# Patient Record
Sex: FEMALE | URBAN - METROPOLITAN AREA
[De-identification: names, ages, dates, MRNs, and addresses within clinical notes are randomized per-mention and may not be internally consistent; named-entity substitution may affect disease eponyms.]

---

## 2020-12-03 ENCOUNTER — OFFICE VISIT (OUTPATIENT)
Dept: URBAN - METROPOLITAN AREA CLINIC 31 | Facility: CLINIC | Age: 70
End: 2020-12-03
Payer: MEDICARE

## 2020-12-03 PROCEDURE — 92134 CPTRZ OPH DX IMG PST SGM RTA: CPT | Performed by: OPHTHALMOLOGY

## 2020-12-03 PROCEDURE — 92014 COMPRE OPH EXAM EST PT 1/>: CPT | Performed by: OPHTHALMOLOGY

## 2020-12-03 ASSESSMENT — INTRAOCULAR PRESSURE
OS: 10
OD: 12

## 2020-12-03 NOTE — IMPRESSION/PLAN
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 
s/p Avastin last 09/10/20
s/p Ozurdex last 06/06/2019(SI) Plan: Vision is worse OD today and stable OS. Exam and OCT confirm improving DME s/p avastin OU. Recommend bilateral avastin injections today to prevent recurrence as there is significant non-perfusion in the periphery. The diagnosis, natural history, and prognosis of PDR with DME, as well as the R/B/A of PRP, anti-VEGF, vitrectomy, and observation were discussed. BS/BP/lipid control discussed. Avastin OU performed today without complication RTC 10- 12 weeks OCT OU reeval Avastin vs Ozurdex

## 2020-12-03 NOTE — IMPRESSION/PLAN
Impression: Glaucoma: H40.9. OU. Plan: Glaucoma suspect based on ON appearance. IOP is slightly elevated and ON appearance is suspicious. Continue with timolol, BID in both eyes. Will follow closely.

## 2020-12-03 NOTE — IMPRESSION/PLAN
Impression: Vitreous hemorrhage, bilateral: H43.13 OU. Plan: Vision notes floaters OS. Exam confirms hemorrhage in the left eye>OD, secondary to PDR. Discussed R/B/A's of observation vs. PPV vs. Anti-VEGF injection. Recommend antiVEGF OU. Patient to sleep with elevation. May consider PPV if does not resolve on its own.

## 2021-03-04 ENCOUNTER — OFFICE VISIT (OUTPATIENT)
Dept: URBAN - METROPOLITAN AREA CLINIC 31 | Facility: CLINIC | Age: 71
End: 2021-03-04
Payer: MEDICARE

## 2021-03-04 PROCEDURE — 92012 INTRM OPH EXAM EST PATIENT: CPT | Performed by: OPHTHALMOLOGY

## 2021-03-04 PROCEDURE — 92134 CPTRZ OPH DX IMG PST SGM RTA: CPT | Performed by: OPHTHALMOLOGY

## 2021-03-04 ASSESSMENT — INTRAOCULAR PRESSURE
OS: 16
OD: 18

## 2021-03-04 NOTE — IMPRESSION/PLAN
Impression: Vitreous hemorrhage, bilateral: H43.13 OU. Plan: Vision notes floaters OS. Exam confirms hemorrhage is improving in the left eye>OD, secondary to PDR. Discussed R/B/A's of observation vs. PPV vs. Anti-VEGF injection. Recommend antiVEGF OU. Patient to sleep with elevation. May consider PPV if does not resolve on its own.

## 2021-03-04 NOTE — IMPRESSION/PLAN
Impression: Type 2 diab with prolif diab rtnop with macular edema, bi: [id number]. OU. 
s/p Avastin last 12/03/20
s/p Ozurdex last 06/06/2019(SI) Plan: Vision is stable today OU. Exam and OCT confirm improving DME s/p avastin OU. Recommend bilateral avastin injections today to prevent recurrence as there is significant non-perfusion in the periphery. The diagnosis, natural history, and prognosis of PDR with DME, as well as the R/B/A of PRP, anti-VEGF, vitrectomy, and observation were discussed. BS/BP/lipid control discussed. Avastin OU performed today without complication RTC 10- 12 weeks OCT OU reeval Avastin vs Ozurdex

## 2021-06-03 ENCOUNTER — OFFICE VISIT (OUTPATIENT)
Dept: URBAN - METROPOLITAN AREA CLINIC 31 | Facility: CLINIC | Age: 71
End: 2021-06-03
Payer: MEDICARE

## 2021-06-03 DIAGNOSIS — E11.3513 TYPE 2 DIAB WITH PROLIF DIAB RTNOP WITH MACULAR EDEMA, BI: Primary | ICD-10-CM

## 2021-06-03 DIAGNOSIS — Z96.1 PRESENCE OF INTRAOCULAR LENS: ICD-10-CM

## 2021-06-03 DIAGNOSIS — H04.123 DRY EYE SYNDROME OF BILATERAL LACRIMAL GLANDS: ICD-10-CM

## 2021-06-03 PROCEDURE — 92012 INTRM OPH EXAM EST PATIENT: CPT | Performed by: OPHTHALMOLOGY

## 2021-06-03 PROCEDURE — 92134 CPTRZ OPH DX IMG PST SGM RTA: CPT | Performed by: OPHTHALMOLOGY

## 2021-06-03 ASSESSMENT — INTRAOCULAR PRESSURE
OD: 18
OS: 18

## 2021-06-03 NOTE — IMPRESSION/PLAN
Impression: Type 2 diab with prolif diab rtnop with macular edema, bi: [id number]. OU. 
s/p Avastin last 03/04/2021
s/p Ozurdex last 06/06/2019-SI Plan: Vision is stable today OU. Exam and OCT confirm improving DME s/p avastin OU. Recommend bilateral avastin injections today to prevent recurrence as there is significant non-perfusion in the periphery. The diagnosis, natural history, and prognosis of PDR with DME, as well as the R/B/A of PRP, anti-VEGF, vitrectomy, and observation were discussed. BS/BP/lipid control discussed. Avastin OU performed today without complication RTC 12 weeks OCT OU reeval Avastin

## 2021-09-09 ENCOUNTER — OFFICE VISIT (OUTPATIENT)
Dept: URBAN - METROPOLITAN AREA CLINIC 31 | Facility: CLINIC | Age: 71
End: 2021-09-09
Payer: MEDICARE

## 2021-09-09 DIAGNOSIS — H43.13 VITREOUS HEMORRHAGE, BILATERAL: ICD-10-CM

## 2021-09-09 PROCEDURE — 92014 COMPRE OPH EXAM EST PT 1/>: CPT | Performed by: OPHTHALMOLOGY

## 2021-09-09 ASSESSMENT — INTRAOCULAR PRESSURE
OS: 16
OD: 15

## 2021-09-09 NOTE — IMPRESSION/PLAN
Impression: Glaucoma: H40.9. OU. Plan: Glaucoma suspect based on ON appearance. IOP is improved and ON appearance is suspicious. Continue with timolol, BID in both eyes. Will follow closely.

## 2021-09-09 NOTE — IMPRESSION/PLAN
Impression: Type 2 diab with prolif diab rtnop with macular edema, bi: [id number]. OU. 
s/p Avastin last 06/30/2021
s/p Ozurdex last 06/06/2019-SI Plan: Vision is stable today OU. Exam and OCT confirm improving DME s/p avastin OU. Recommend bilateral avastin injections today to prevent recurrence as there is significant non-perfusion in the periphery. The diagnosis, natural history, and prognosis of PDR with DME, as well as the R/B/A of PRP, anti-VEGF, vitrectomy, and observation were discussed. BS/BP/lipid control discussed. Avastin OU performed today without complication RTC 12 weeks OCT OU reeval Avastin

## 2021-12-02 ENCOUNTER — OFFICE VISIT (OUTPATIENT)
Dept: URBAN - METROPOLITAN AREA CLINIC 31 | Facility: CLINIC | Age: 71
End: 2021-12-02
Payer: MEDICARE

## 2021-12-02 DIAGNOSIS — H40.9 GLAUCOMA: ICD-10-CM

## 2021-12-02 PROCEDURE — 92134 CPTRZ OPH DX IMG PST SGM RTA: CPT | Performed by: OPHTHALMOLOGY

## 2021-12-02 PROCEDURE — 92014 COMPRE OPH EXAM EST PT 1/>: CPT | Performed by: OPHTHALMOLOGY

## 2021-12-02 ASSESSMENT — INTRAOCULAR PRESSURE
OS: 17
OD: 19

## 2021-12-02 NOTE — IMPRESSION/PLAN
Impression: Type 2 diab with prolif diab rtnop with macular edema, bi: [id number]. OU. 
s/p Avastin last 09/09/2021
s/p Ozurdex last 06/06/2019-SI Plan: Vision is stable today OU. Exam and OCT confirm improving DME s/p avastin OU. Recommend bilateral avastin injections today to prevent recurrence as there is significant non-perfusion in the periphery. The diagnosis, natural history, and prognosis of PDR with DME, as well as the R/B/A of PRP, anti-VEGF, vitrectomy, and observation were discussed. BS/BP/lipid control discussed. Avastin OU performed today without complication RTC 12 weeks OCT OU reeval Avastin

## 2022-02-24 ENCOUNTER — OFFICE VISIT (OUTPATIENT)
Dept: URBAN - METROPOLITAN AREA CLINIC 31 | Facility: CLINIC | Age: 72
End: 2022-02-24
Payer: MEDICARE

## 2022-02-24 PROCEDURE — 99214 OFFICE O/P EST MOD 30 MIN: CPT | Performed by: OPHTHALMOLOGY

## 2022-02-24 PROCEDURE — 92134 CPTRZ OPH DX IMG PST SGM RTA: CPT | Performed by: OPHTHALMOLOGY

## 2022-02-24 ASSESSMENT — INTRAOCULAR PRESSURE
OS: 16
OD: 14

## 2022-02-24 NOTE — IMPRESSION/PLAN
Impression: Type 2 diab with prolif diab rtnop with macular edema, bi: [id number]. OU. 
s/p Avastin last 12/02/2021
s/p Ozurdex last 06/06/2019-SI Plan: Vision is stable today OU. Exam and OCT confirm improving DME s/p avastin OU. Recommend bilateral avastin injections today to prevent recurrence as there is significant non-perfusion in the periphery. The diagnosis, natural history, and prognosis of PDR with DME, as well as the R/B/A of PRP, anti-VEGF, vitrectomy, and observation were discussed. BS/BP/lipid control discussed. Avastin OU performed today without complication RTC 12 weeks OCT OU reeval Avastin

## 2022-05-26 ENCOUNTER — OFFICE VISIT (OUTPATIENT)
Dept: URBAN - METROPOLITAN AREA CLINIC 31 | Facility: CLINIC | Age: 72
End: 2022-05-26
Payer: MEDICARE

## 2022-05-26 DIAGNOSIS — H43.13 VITREOUS HEMORRHAGE, BILATERAL: ICD-10-CM

## 2022-05-26 DIAGNOSIS — Z96.1 PRESENCE OF INTRAOCULAR LENS: ICD-10-CM

## 2022-05-26 DIAGNOSIS — E11.3513 TYPE 2 DIAB WITH PROLIF DIAB RTNOP WITH MACULAR EDEMA, BI: Primary | ICD-10-CM

## 2022-05-26 DIAGNOSIS — H40.9 GLAUCOMA: ICD-10-CM

## 2022-05-26 DIAGNOSIS — H04.123 DRY EYE SYNDROME OF BILATERAL LACRIMAL GLANDS: ICD-10-CM

## 2022-05-26 PROCEDURE — 99214 OFFICE O/P EST MOD 30 MIN: CPT | Performed by: OPHTHALMOLOGY

## 2022-05-26 PROCEDURE — 92134 CPTRZ OPH DX IMG PST SGM RTA: CPT | Performed by: OPHTHALMOLOGY

## 2022-05-26 ASSESSMENT — INTRAOCULAR PRESSURE
OS: 9
OD: 10

## 2022-08-11 ENCOUNTER — OFFICE VISIT (OUTPATIENT)
Dept: URBAN - METROPOLITAN AREA CLINIC 31 | Facility: CLINIC | Age: 72
End: 2022-08-11
Payer: MEDICARE

## 2022-08-11 DIAGNOSIS — E11.3513 TYPE 2 DIAB WITH PROLIF DIAB RTNOP WITH MACULAR EDEMA, BI: Primary | ICD-10-CM

## 2022-08-11 DIAGNOSIS — H40.9 GLAUCOMA: ICD-10-CM

## 2022-08-11 DIAGNOSIS — S05.12XA CONTUSION OF EYEBALL AND ORBITAL TISSUES, LEFT EYE, INIT: ICD-10-CM

## 2022-08-11 DIAGNOSIS — Z96.1 PRESENCE OF INTRAOCULAR LENS: ICD-10-CM

## 2022-08-11 DIAGNOSIS — H04.123 DRY EYE SYNDROME OF BILATERAL LACRIMAL GLANDS: ICD-10-CM

## 2022-08-11 DIAGNOSIS — H43.13 VITREOUS HEMORRHAGE, BILATERAL: ICD-10-CM

## 2022-08-11 PROCEDURE — 99214 OFFICE O/P EST MOD 30 MIN: CPT | Performed by: OPHTHALMOLOGY

## 2022-08-11 PROCEDURE — 92134 CPTRZ OPH DX IMG PST SGM RTA: CPT | Performed by: OPHTHALMOLOGY

## 2022-08-11 RX ORDER — METOPROLOL SUCCINATE 50 MG/1
50 MG TABLET, EXTENDED RELEASE ORAL
Qty: 0 | Refills: 0 | Status: ACTIVE
Start: 2022-08-11

## 2022-08-11 ASSESSMENT — INTRAOCULAR PRESSURE
OD: 16
OS: 17

## 2022-08-11 NOTE — IMPRESSION/PLAN
Impression: Contusion of eyeball and orbital tissues, left eye, init: S05.12xA. Plan: Patient fell backward and fell onto the left side of her face. Exam demonstrates periocular ecchymosis. Fortunately, no evidence of orbital fracture. EOM remains full. Observe.

## 2022-08-11 NOTE — IMPRESSION/PLAN
Impression: Type 2 diab with prolif diab rtnop with macular edema, bi: [id number]. OU. 
s/p Avastin last 05/26/2022
s/p Ozurdex last 06/06/2019-SI Plan: Vision is stable today OU. Exam and OCT confirm improving DME s/p avastin OU. Recommend bilateral avastin injections today to prevent recurrence as there is significant non-perfusion in the periphery. The diagnosis, natural history, and prognosis of PDR with DME, as well as the R/B/A of PRP, anti-VEGF, vitrectomy, and observation were discussed. BS/BP/lipid control discussed. Avastin OU performed today without complication RTC 12 weeks OCT OU reeval Avastin